# Patient Record
Sex: FEMALE | Race: WHITE | NOT HISPANIC OR LATINO
[De-identification: names, ages, dates, MRNs, and addresses within clinical notes are randomized per-mention and may not be internally consistent; named-entity substitution may affect disease eponyms.]

---

## 2022-01-19 ENCOUNTER — TRANSCRIPTION ENCOUNTER (OUTPATIENT)
Age: 57
End: 2022-01-19

## 2022-01-20 ENCOUNTER — OUTPATIENT (OUTPATIENT)
Dept: OUTPATIENT SERVICES | Facility: HOSPITAL | Age: 57
LOS: 1 days | End: 2022-01-20

## 2022-01-20 ENCOUNTER — APPOINTMENT (OUTPATIENT)
Dept: DISASTER EMERGENCY | Facility: HOSPITAL | Age: 57
End: 2022-01-20

## 2022-01-20 VITALS
RESPIRATION RATE: 15 BRPM | WEIGHT: 188.05 LBS | TEMPERATURE: 99 F | SYSTOLIC BLOOD PRESSURE: 153 MMHG | HEART RATE: 94 BPM | DIASTOLIC BLOOD PRESSURE: 84 MMHG | HEIGHT: 66 IN | OXYGEN SATURATION: 95 %

## 2022-01-20 VITALS
TEMPERATURE: 100 F | OXYGEN SATURATION: 99 % | SYSTOLIC BLOOD PRESSURE: 135 MMHG | HEART RATE: 87 BPM | DIASTOLIC BLOOD PRESSURE: 74 MMHG | RESPIRATION RATE: 16 BRPM

## 2022-01-20 DIAGNOSIS — U07.1 COVID-19: ICD-10-CM

## 2022-01-20 RX ORDER — SOTROVIMAB 62.5 MG/ML
500 INJECTION, SOLUTION, CONCENTRATE INTRAVENOUS ONCE
Refills: 0 | Status: COMPLETED | OUTPATIENT
Start: 2022-01-20 | End: 2022-01-20

## 2022-01-20 RX ORDER — SODIUM CHLORIDE 9 MG/ML
250 INJECTION INTRAMUSCULAR; INTRAVENOUS; SUBCUTANEOUS
Refills: 0 | Status: COMPLETED | OUTPATIENT
Start: 2022-01-20 | End: 2022-01-20

## 2022-01-20 RX ADMIN — SODIUM CHLORIDE 100 MILLILITER(S): 9 INJECTION INTRAMUSCULAR; INTRAVENOUS; SUBCUTANEOUS at 14:05

## 2022-01-20 RX ADMIN — SOTROVIMAB 116 MILLIGRAM(S): 62.5 INJECTION, SOLUTION, CONCENTRATE INTRAVENOUS at 14:05

## 2022-01-20 NOTE — MONOCLONAL ANTIBODY INFUSION - EXAM
CC: Monoclonal Antibody Infusion/COVID 19 Positive  56yFemale    exam/findings:  T(C): 37.4 (01-20-22 @ 13:33), Max: 37.4 (01-20-22 @ 13:33)  HR: 94 (01-20-22 @ 13:33) (94 - 94)  BP: 153/84 (01-20-22 @ 13:33) (153/84 - 153/84)  RR: 15 (01-20-22 @ 13:33) (15 - 15)  SpO2: 95% (01-20-22 @ 13:33) (95% - 95%)      PE:   Appearance: NAD	  HEENT:   Normal oral mucosa,   Lymphatic: No lymphadenopathy  Cardiovascular: Normal S1 S2, No JVD, No murmurs, No edema  Respiratory: Lungs clear to auscultation	  Gastrointestinal:  Soft, Non-tender, + BS	  Skin: warm and dry  Neurologic: Non-focal  Extremities: Normal range of motion,

## 2022-01-20 NOTE — MONOCLONAL ANTIBODY INFUSION - ASSESSMENT AND PLAN
ASSESSMENT:  Pt is a 56 yrs old female with h/o HTN and Diabetes mellitus type 2 , Covid +1/19/22,   referred by Dr. NAVNEET Campbell who presents to infusion center for Monoclonal antibody infusion (Sotrovimab)  Symptoms/ Criteria: cough, fever, sore throat, and HA  Risk Profile includes: HTN, and Diabetes Mellitus type 2     PLAN:  - infusion procedure explained to patient   -Consent for monoclonal antibody infusion obtained   - Risk & benifits discussed/all questions answered  -infuse  Sotrovimab IV over 30 minutes  -observe patient for one hour post infusion and discharge home

## 2025-09-14 ENCOUNTER — NON-APPOINTMENT (OUTPATIENT)
Age: 60
End: 2025-09-14